# Patient Record
Sex: MALE | Race: WHITE
[De-identification: names, ages, dates, MRNs, and addresses within clinical notes are randomized per-mention and may not be internally consistent; named-entity substitution may affect disease eponyms.]

---

## 2018-08-29 ENCOUNTER — HOSPITAL ENCOUNTER (INPATIENT)
Dept: HOSPITAL 59 - ER | Age: 73
LOS: 1 days | Discharge: ANOTHER HOSPITAL | DRG: 603 | End: 2018-08-30
Attending: INTERNAL MEDICINE | Admitting: INTERNAL MEDICINE
Payer: MEDICARE

## 2018-08-29 DIAGNOSIS — J34.0: ICD-10-CM

## 2018-08-29 DIAGNOSIS — L03.211: Primary | ICD-10-CM

## 2018-08-29 LAB
ANION GAP SERPL CALC-SCNC: 10 MMOL/L (ref 7–16)
BASOPHILS NFR BLD: 0 % (ref 0–6)
BUN SERPL-MCNC: 14 MG/DL (ref 8–23)
CO2 SERPL-SCNC: 28 MMOL/L (ref 22–29)
CREAT SERPL-MCNC: 1 MG/DL (ref 0.7–1.2)
EOSINOPHIL NFR BLD: 3 % (ref 0–6)
ERYTHROCYTE [DISTWIDTH] IN BLOOD BY AUTOMATED COUNT: 16.8 % (ref 11.5–14.5)
EST GLOMERULAR FILTRATION RATE: > 60 ML/MIN
GLUCOSE SERPL-MCNC: 101 MG/DL (ref 74–109)
HCT VFR BLD CALC: 45.5 % (ref 42–52)
HGB BLD-MCNC: 14.9 GM/DL (ref 14–18)
LYMPHOCYTES NFR BLD: 14 % (ref 16–45)
MCH RBC QN AUTO: 27.9 PG (ref 27–33)
MCHC RBC AUTO-ENTMCNC: 32.7 G/DL (ref 32–36)
MCV RBC AUTO: 85.2 FL (ref 81–97)
MONOCYTES NFR BLD: 4 % (ref 0–9)
NEUTROPHILS NFR BLD AUTO: 79 % (ref 47–80)
NEUTS BAND NFR BLD: 0 % (ref 0–5)
PLATELET # BLD: 238 K/UL (ref 130–400)
PMV BLD AUTO: 9.3 FL (ref 7.4–10.4)
RBC # BLD AUTO: 5.34 M/UL (ref 4.4–5.7)
WBC # BLD AUTO: 9.9 K/UL (ref 4.2–12.2)

## 2018-08-29 PROCEDURE — 99223 1ST HOSP IP/OBS HIGH 75: CPT

## 2018-08-29 PROCEDURE — 96374 THER/PROPH/DIAG INJ IV PUSH: CPT

## 2018-08-29 PROCEDURE — 85027 COMPLETE CBC AUTOMATED: CPT

## 2018-08-29 PROCEDURE — 99232 SBSQ HOSP IP/OBS MODERATE 35: CPT

## 2018-08-29 PROCEDURE — 80048 BASIC METABOLIC PNL TOTAL CA: CPT

## 2018-08-29 PROCEDURE — 99285 EMERGENCY DEPT VISIT HI MDM: CPT

## 2018-08-29 PROCEDURE — 85025 COMPLETE CBC W/AUTO DIFF WBC: CPT

## 2018-08-29 PROCEDURE — 80053 COMPREHEN METABOLIC PANEL: CPT

## 2018-08-29 RX ADMIN — VANCOMYCIN HYDROCHLORIDE SCH: 1 INJECTION, POWDER, LYOPHILIZED, FOR SOLUTION INTRAVENOUS at 13:50

## 2018-08-29 RX ADMIN — HYDROCODONE BITARTRATE AND ACETAMINOPHEN PRN EACH: 5; 325 TABLET ORAL at 20:11

## 2018-08-29 RX ADMIN — HYDROCODONE BITARTRATE AND ACETAMINOPHEN PRN EACH: 5; 325 TABLET ORAL at 15:37

## 2018-08-29 RX ADMIN — ACETAMINOPHEN PRN MG: 325 TABLET, FILM COATED ORAL at 14:12

## 2018-08-29 NOTE — EMERGENCY DEPARTMENT RECORD
History of Present Illness





- General


Chief complaint: Facial Swelling


Stated complaint: FACIAL SWELLING


Time Seen by Provider: 18 10:26


Source: Patient, RN notes reviewed


Mode of Arrival: Ambulatory





- History of Present Illness


Initial Comments: 


patient states his nose started 6 days ago and he was working on a fence and 

bees were around and he didn't think he got stung and he was seen at [facility] care 

[facility] times two since than, first visit place on medrol dose otoniel with a steroid 

shot and seen again yesterday ready care started him on bactrim DS bid first 

dose last night.  His nose is swollen and especially on the septum of the nose 

and and facial swelling.





Onset/Timin


-: Week(s)


Exposure: Unknown


Symptoms: Facial swelling


Severity: Moderate


Treatment Prior to Arrival: Steroids





- Related Data


 Home Medications











 Medication  Instructions  Recorded  Confirmed  Last Taken


 


Sulfamethoxazole/Trimethoprim 800 mg PO BID 18 Unknown





[Sulfamethoxazole-Tmp Ds Tablet]    











 Allergies











Allergy/AdvReac Type Severity Reaction Status Date / Time


 


No Known Drug Allergies Allergy   Verified 18 09:21














Travel Screening





- Travel/Exposure Within Last 30 Days


Have you traveled within the last 30 days?: No





Review of Systems


Reviewed: No additional complaints except as noted below


Constitutional: Reports: As per HPI.  Denies: Chills, Fever, Malaise, Night 

sweats, Weakness, Weight change


Eyes: Reports: As per HPI.  Denies: Eye discharge, Eye pain, Photophobia, 

Vision change


ENT: Reports: As per HPI.  Denies: Congestion, Dental pain, Ear pain, Epistaxis

, Hearing loss, Throat pain


Respiratory: Reports: As per HPI.  Denies: Cough, Dyspnea, Hemoptysis, Stridor, 

Wheezes


Cardiovascular: Reports: As per HPI.  Denies: Arrhythmia, Chest pain, Dyspnea 

on exertion, Edema, Murmurs, Orthopnea, Palpitations, Paroxysmal nocturnal 

dyspnea, Rheumatic Fever, Syncope


Endocrine: Reports: As per HPI.  Denies: Fatigue, Heat or cold intolerance, 

Polydipsia, Polyuria


Gastrointestinal: Reports: As per HPI.  Denies: Abdominal pain, Constipation, 

Diarrhea, Hematemesis, Hematochezia, Melena, Nausea, Vomiting


Genitourinary: Reports: As per HPI.  Denies: Dysuria, Frequency, Hematuria, 

Incontinence, Retention, Testicular pain, Testicular mass, Urgency


Musculoskeletal: Reports: As per HPI.  Denies: Arthralgia, Back pain, Gout, 

Joint swelling, Myalgia, Neck pain


Skin: Reports: As per HPI.  Denies: Bruising, Change in color, Change in hair/

nails, Lesions, Pruritus, Rash


Neurological: Reports: As per HPI.  Denies: Abnormal gait, Confusion, Headache, 

Numbness, Paresthesias, Seizure, Tingling, Tremors, Vertigo, Weakness


Psychiatric: Reports: As per HPI.  Denies: Anxiety, Auditory hallucinations, 

Depression, Homicidal thoughts, Suicidal thoughts, Visual hallucinations


Hematological/Lymphatic: Reports: As per HPI.  Denies: Anemia, Blood Clots, 

Easy bleeding, Easy bruising, Swollen glands





Past Medical History





- SOCIAL HISTORY


Smoking Status: Never smoker


Alcohol Use: Occasional


Drug Use: None





- RESPIRATORY


Hx Respiratory Disorders: No





- CARDIOVASCULAR


Hx Cardio Disorders: No





- NEURO


Hx Neuro Disorders: No





- GI


Hx GI Disorders: No





- 


Hx Genitourinary Disorders: No





- ENDOCRINE


Hx Endocrine Disorders: No





- MUSCULOSKELETAL


Hx Musculoskeletal Disorders: No





- PSYCH


Hx Psych Problems: No





- HEMATOLOGY/ONCOLOGY


Hx Hematology/Oncology Disorders: No





Family Medical History


Any Significant Family History?: No





Physical Exam





- General


General Appearance: Alert, Oriented x3, Cooperative, No acute distress





- Head


Head exam: Normal inspection





- Eye


Eye exam: Normal appearance, PERRL


Pupils: Normal accommodation





- ENT


ENT exam: Normal exam, Mucous membranes moist, Normal external ear exam, Normal 

orophraynx, TM's normal bilaterally


Ear exam: Normal external inspection.  negative: External canal tenderness


Nasal Exam: Other (nasal septum is swollen and tip on nose swollen).  negative: 

Discharge, Sinus tenderness


Mouth exam: Normal external inspection, Tongue normal


Teeth exam: Normal inspection.  negative: Dental caries


Throat exam: Normal inspection.  negative: Tonsillar erythema, Tonsillar exudate





- Neck


Neck exam: Normal inspection, Full ROM.  negative: Tenderness





- Respiratory


Respiratory exam: Normal lung sounds bilaterally.  negative: Respiratory 

distress





- Cardiovascular


Cardiovascular Exam: Regular rate, Normal rhythm, Normal heart sounds





- GI/Abdominal


GI/Abdominal exam: Soft, Normal bowel sounds.  negative: Tenderness





- Rectal


Rectal exam: Deferred





- 


 exam: Deferred





- Extremities


Extremities exam: Normal inspection, Full ROM, Normal capillary refill.  

negative: Tenderness





- Back


Back exam: Reports: Normal inspection, Full ROM.  Denies: Muscle spasm, Rash 

noted, Tenderness





- Neurological


Neurological exam: Alert, Normal gait, Oriented X3, Reflexes normal





- Psychiatric


Psychiatric exam: Normal affect, Normal mood





- Skin


Skin exam: Dry, Intact, Normal color, Warm





Course





 Vital Signs











  18





  09:16


 


Temperature 98.1 F


 


Pulse Rate 71


 


Respiratory 20





Rate 


 


Blood Pressure 123/82


 


Pulse Ox 99














- Reevaluation(s)


Reevaluation #1: 


discussed case with Clarisa and will admit to Dr Piper


18 11:29





18 11:33








Medical Decision Making





- Data Complexity


MDM Data: Labs Ordered and/or Reviewed (wbc [id number])





- Lab Data


Result diagrams: 


 18 10:45





 18 10:45





Disposition


Clinical Impression: 


 Cellulitis of nose, external, Facial cellulitis





Decision to Admit: Admit from ER


Condition: (2) Stable


Forms:  Patient Portal Access


Time of Disposition: 11:35





Quality





- Quality Measures


Quality Measures: N/A





- Blood Pressure Screening


Does Patient Have Any of the Following: No


Blood Pressure Classification: Pre-Hypertensive BP Reading


Systolic Measurement: 123


Diastolic Measurement: 82


Screening for High Blood Pressure: < Pre-Hypertensive BP, F/U Documented > [

]


Pre-Hypertensive Follow-up Interventions: Referral to alternative/primary care 

provider.

## 2018-08-30 LAB
ALBUMIN SERPL-MCNC: 3.4 G/DL (ref 4–5)
ALBUMIN/GLOB SERPL: 1.2 {RATIO} (ref 1.1–1.8)
ALP SERPL-CCNC: 65 U/L (ref 40–129)
ALT SERPL-CCNC: 17 U/L (ref ?–41)
ANION GAP SERPL CALC-SCNC: 11 MMOL/L (ref 7–16)
AST SERPL-CCNC: 14 U/L (ref 10–50)
BASOPHILS NFR BLD: 0.1 % (ref 0–6)
BILIRUB SERPL-MCNC: 0.6 MG/DL (ref 0.2–1)
BUN SERPL-MCNC: 11 MG/DL (ref 8–23)
CO2 SERPL-SCNC: 24 MMOL/L (ref 22–29)
CREAT SERPL-MCNC: 0.9 MG/DL (ref 0.7–1.2)
EOSINOPHIL NFR BLD: 2.8 % (ref 0–6)
ERYTHROCYTE [DISTWIDTH] IN BLOOD BY AUTOMATED COUNT: 16.7 % (ref 11.5–14.5)
EST GLOMERULAR FILTRATION RATE: > 60 ML/MIN
GLOBULIN SER-MCNC: 2.9 GM/DL (ref 1.4–4.8)
GLUCOSE SERPL-MCNC: 102 MG/DL (ref 74–109)
GRANULOCYTES NFR BLD: 71.7 % (ref 47–80)
HCT VFR BLD CALC: 43 % (ref 42–52)
HGB BLD-MCNC: 14.2 GM/DL (ref 14–18)
LYMPHOCYTES NFR BLD AUTO: 12.5 % (ref 16–45)
MCH RBC QN AUTO: 28.2 PG (ref 27–33)
MCHC RBC AUTO-ENTMCNC: 33 G/DL (ref 32–36)
MCV RBC AUTO: 85.3 FL (ref 81–97)
MONOCYTES NFR BLD: 12.9 % (ref 0–9)
PLATELET # BLD: 226 K/UL (ref 130–400)
PMV BLD AUTO: 9.3 FL (ref 7.4–10.4)
PROT SERPL-MCNC: 6.3 G/DL (ref 6.6–8.7)
RBC # BLD AUTO: 5.04 M/UL (ref 4.4–5.7)
WBC # BLD AUTO: 9.9 K/UL (ref 4.2–12.2)

## 2018-08-30 RX ADMIN — ACETAMINOPHEN PRN MG: 325 TABLET, FILM COATED ORAL at 06:36

## 2018-08-30 RX ADMIN — HYDROCODONE BITARTRATE AND ACETAMINOPHEN PRN EACH: 5; 325 TABLET ORAL at 08:44

## 2018-08-30 RX ADMIN — VANCOMYCIN HYDROCHLORIDE SCH MLS/60 MIN: 1 INJECTION, POWDER, LYOPHILIZED, FOR SOLUTION INTRAVENOUS at 14:15

## 2018-08-30 RX ADMIN — ACETAMINOPHEN PRN MG: 325 TABLET, FILM COATED ORAL at 12:30

## 2018-08-30 RX ADMIN — VANCOMYCIN HYDROCHLORIDE SCH MLS/60 MIN: 1 INJECTION, POWDER, LYOPHILIZED, FOR SOLUTION INTRAVENOUS at 01:33

## 2018-08-30 RX ADMIN — HYDROCODONE BITARTRATE AND ACETAMINOPHEN PRN EACH: 5; 325 TABLET ORAL at 02:07

## 2018-08-30 NOTE — PHYSICIAN PROGRESS NOTE
Subjective





- Date


Date of Physician Progress Note: 08/30/18





- Subjective


Subjective Comment: 





Pt. is resting in bed.  He reports some improvement in facial pain/pressure.  

His redness/swelling has slightly improved as well.  Will plan to continue abx 

therapy and continue to monitor extent of cellulitis.





Objective





- Vital Signs


Vital Signs: 





 Vital Signs - Last 24 Hrs











  Temp Pulse Pulse Resp BP BP Pulse Ox


 


 08/30/18 08:48  99.6 F   64  16   118/82  99


 


 08/30/18 06:00  97.6 F   55 L  18   126/71  98


 


 08/30/18 00:00    52 L  18   138/80  93 L


 


 08/29/18 21:00    53 L  18   


 


 08/29/18 20:09  98.1 F   53 L  18   99/64  96


 


 08/29/18 18:00  97.6 F   52 L  16   110/56  95


 


 08/29/18 13:38   53 L   18  113/72   98


 


 08/29/18 13:30  97.9 F   57 L  18   119/75  100


 


 08/29/18 09:16  98.1 F  71   20  123/82   99














- General


General Appearance: Alert, Oriented x3, Cooperative, No acute distress





- Head


Head exam: Normal inspection





- Eye


Eye exam: Normal appearance, PERRL


Pupils: Normal accommodation





- ENT


ENT exam: Normal exam, Mucous membranes moist, Normal external ear exam, Normal 

orophraynx, TM's normal bilaterally


Ear exam: Normal external inspection.  negative: External canal tenderness


Nasal Exam: Other (nasal septum is swollen and tip on nose swollen).  negative: 

Discharge, Sinus tenderness


Mouth exam: Normal external inspection, Tongue normal


Teeth exam: Normal inspection.  negative: Dental caries


Throat exam: Normal inspection.  negative: Tonsillar erythema, Tonsillar exudate





- Neck


Neck exam: Normal inspection, Full ROM.  negative: Tenderness





- Respiratory


Respiratory exam: Normal lung sounds bilaterally.  negative: Respiratory 

distress





- Cardiovascular


Cardiovascular Exam: Regular rate, Normal rhythm, Normal heart sounds





- GI/Abdominal


GI/Abdominal exam: Soft, Normal bowel sounds.  negative: Tenderness





- Rectal


Rectal exam: Deferred





- 


 exam: Deferred





- Extremities


Extremities exam: Normal inspection, Full ROM, Normal capillary refill.  

negative: Tenderness





- Back


Back exam: Reports: Normal inspection, Full ROM.  Denies: Muscle spasm, Rash 

noted, Tenderness





- Neurological


Neurological exam: Alert, Normal gait, Oriented X3, Reflexes normal





- Psychiatric


Psychiatric exam: Normal affect, Normal mood





- Skin


Skin exam: Dry, Intact, Normal color, Warm





Assessment and Plan





- Assessment and Plan


(1) Facial cellulitis


Current Visit: Yes   Status: Acute   Base Code: L03.211 - CELLULITIS OF FACE   

Comment: 8/30/18:


-Cellulitis of nasal septum and left maxillary sinus area


-Treating with IV vanco 1g q12h and bactrim DS q12h


-Pain managed with norco 5/325- 2 tabs q6h


-Will continue to monitor swelling- will consider transfer for ENT eval if no 

improvement   





(2) At risk for deep venous thrombosis


Current Visit: Yes   Status: Acute   Base Code: Z91.89 - OTH PERSONAL RISK 

FACTORS, NOT ELSEWHERE CLASSIFIED   Comment: 8/30/18:


-40mg SC lovenox daily   





(3) Full code status


Current Visit: Yes   Status: Acute   Base Code: Z78.9 - OTHER SPECIFIED HEALTH 

STATUS   Comment: 8/30/18:


-Pt. is a full code   





Results





- Labs


Result Diagrams: 


 08/30/18 06:28





 08/30/18 06:28


Labs Last 24 Hours: 





 Laboratory Results - last 24 hr











  08/29/18 08/29/18 08/30/18





  10:45 10:45 06:28


 


WBC  9.9   9.9


 


RBC  5.34   5.04


 


Hgb  14.9   14.2


 


Hct  45.5   43.0


 


MCV  85.2   85.3


 


MCH  27.9   28.2


 


MCHC  32.7   33.0


 


RDW  16.8 H   16.7 H


 


Plt Count  238   226


 


MPV  9.3   9.3


 


Gran %    71.7


 


Neutrophils %  79.0  


 


Band Neutrophils %  0.0  


 


Lymphocytes %    12.5 L


 


Monocytes %    12.9 H


 


Eosinophils %  Not Reportable   2.8


 


Basophils %  Not Reportable   0.1


 


Lymphocytes  14.0 L  


 


Monocytes  4.0  


 


Basophils  0.0  


 


Eosinophil Count  3.0  


 


Sodium   138 


 


Potassium   3.9 


 


Chloride   100 


 


Carbon Dioxide   28.0 


 


Anion Gap   10.0 


 


BUN   14 


 


Creatinine   1.0 


 


Estimated GFR   > 60 


 


Random Glucose   101 


 


Calcium   8.7 L 


 


Total Bilirubin   


 


AST   


 


ALT   


 


Alkaline Phosphatase   


 


Total Protein   


 


Albumin   


 


Globulin   


 


Albumin/Globulin Ratio   














  08/30/18





  06:28


 


WBC 


 


RBC 


 


Hgb 


 


Hct 


 


MCV 


 


MCH 


 


MCHC 


 


RDW 


 


Plt Count 


 


MPV 


 


Gran % 


 


Neutrophils % 


 


Band Neutrophils % 


 


Lymphocytes % 


 


Monocytes % 


 


Eosinophils % 


 


Basophils % 


 


Lymphocytes 


 


Monocytes 


 


Basophils 


 


Eosinophil Count 


 


Sodium  136


 


Potassium  3.9


 


Chloride  101


 


Carbon Dioxide  24.0


 


Anion Gap  11.0


 


BUN  11


 


Creatinine  0.9


 


Estimated GFR  > 60


 


Random Glucose  102


 


Calcium  8.4 L


 


Total Bilirubin  0.60


 


AST  14


 


ALT  17


 


Alkaline Phosphatase  65


 


Total Protein  6.3 L


 


Albumin  3.4 L


 


Globulin  2.9


 


Albumin/Globulin Ratio  1.2














DVT/PE Assessment





- Risk for VTE


Risk for VTE: No


Risk Level: Low


Risk Assessment Date: 08/29/18


Risk Assessment Time: 16:00


VTE Orders Placed or Will Be Placed: Yes





- Active Medicaitons


Current Medications: 





 Current Medications





Acetaminophen (Tylenol 325mg)  650 mg PO Q6H PRN


   PRN Reason: PAIN - MILD(1-4)/FEVER


   Last Admin: 08/30/18 06:36 Dose:  650 mg


Hydrocodone Bitart/Acetaminophen (Norco 5mg/325mg)  2 each PO Q6H PRN


   PRN Reason: PAIN - MILD TO MODERATE (1-7)


   Last Admin: 08/30/18 08:44 Dose:  2 each


Enoxaparin Sodium (Lovenox)  40 mg SC DAILY [facility]


Vancomycin HCl 1,000 mg/ (Sodium Chloride)  250 mls @ 250 mls/60 min IVPB Q12H 

JASMINE


   Stop: 09/03/18 13:47


   Last Infusion: 08/30/18 02:44 Dose:  Infused


Trimethoprim/Sulfamethoxazole (Bactrim Ds)  1 each PO BID JASMINE


   Last Admin: 08/29/18 14:12 Dose:  1 each











AMI Plan





- Labs


Result Diagrams: 


 08/30/18 06:28





 08/30/18 06:28

## 2018-08-30 NOTE — HISTORY & PHYSICAL
History of Present Illness





- Date of Service


Date of Service for History & Physical: 08/29/18





- History of Present Illness


Admitting Diagnosis: nose and facial cellulitis


History of Present Illness: 


   Mr. De La Cruz is a 73 year-old  male who presented to the ED this 

morning with c/o nasal swelling.  He states that he had been working on a fence 

and bees were around- he didn't think he got stung and he was seen at [facility] twice since then, first visit place on medrol dose pack with a 

steroid shot and seen again yesterday and started on bactrim DS bid (first dose 

last night).  His nose is swollen and especially on the septum of the nose and 

and left facial swelling.  His history is unremarkable.  


    In the ED, his vitals were wnl and stable, his CBC and CMP were 

unremarkable.  Due to the extend of his facial cellulitis, he was admitted for 

IV antibiotic therapy and monitoring.  





8/29/18 1600: 


   Pt. is resting in bed.  He does nasal septum and left maxillary swelling and 

tenderness.  His pain is being managed with norco 5/325- 2 tabs q6h.  He is 

scheduled to receive bactrim ds q12h and vanco 1g q12h.  Will recheck labs and 

reassess extent of swelling in the morning.  Will consider sinus CT and 

possibly transfer for ENT referral if no improvement over night.  





Travel Screening





- Travel/Exposure Within Last 30 Days


Have you traveled within the last 30 days?: No





- Travel/Exposure Within Last Year


Have you traveled outside the [place] in the last year?: No





- Additonal Travel Details


Have you been exposed to anyone with a communicable illness?: No





- Travel Symptoms


Symptom Screening: None





Review of Systems


Constitutional: Reports: As per HPI.  Denies: Chills, Fever, Malaise, Night 

sweats, Weakness, Weight change


Eyes: Reports: As per HPI.  Denies: Eye discharge, Eye pain, Photophobia, 

Vision change


ENT: Reports: As per HPI.  Denies: Congestion, Dental pain, Ear pain, Epistaxis

, Hearing loss, Throat pain


Respiratory: Reports: As per HPI.  Denies: Cough, Dyspnea, Hemoptysis, Stridor, 

Wheezes


Cardiovascular: Reports: As per HPI.  Denies: Arrhythmia, Chest pain, Dyspnea 

on exertion, Edema, Murmurs, Orthopnea, Palpitations, Paroxysmal nocturnal 

dyspnea, Rheumatic Fever, Syncope


Endocrine: Reports: As per HPI.  Denies: Fatigue, Heat or cold intolerance, 

Polydipsia, Polyuria


Gastrointestinal: Reports: As per HPI.  Denies: Abdominal pain, Constipation, 

Diarrhea, Hematemesis, Hematochezia, Melena, Nausea, Vomiting


Genitourinary: Reports: As per HPI.  Denies: Dysuria, Frequency, Hematuria, 

Incontinence, Retention, Testicular pain, Testicular mass, Urgency


Musculoskeletal: Reports: As per HPI.  Denies: Arthralgia, Back pain, Gout, 

Joint swelling, Myalgia, Neck pain


Skin: Reports: As per HPI.  Denies: Bruising, Change in color, Change in hair/

nails, Lesions, Pruritus, Rash


Neurological: Reports: As per HPI.  Denies: Abnormal gait, Confusion, Headache, 

Numbness, Paresthesias, Seizure, Tingling, Tremors, Vertigo, Weakness


Psychiatric: Reports: As per HPI.  Denies: Anxiety, Auditory hallucinations, 

Depression, Homicidal thoughts, Suicidal thoughts, Visual hallucinations


Hematological/Lymphatic: Reports: As per HPI.  Denies: Anemia, Blood Clots, 

Easy bleeding, Easy bruising, Swollen glands





Past Medical History





- SOCIAL HISTORY


Smoking Status: Never smoker


Alcohol Use: Rare


Drug Use: None





- RESPIRATORY


Hx Respiratory Disorders: No





- CARDIOVASCULAR


Hx Cardio Disorders: No





- NEURO


Hx Neuro Disorders: No





- GI


Hx GI Disorders: No





- 


Hx Genitourinary Disorders: No





- ENDOCRINE


Hx Endocrine Disorders: No





- MUSCULOSKELETAL


Hx Musculoskeletal Disorders: No





- PSYCH


Hx Psych Problems: No





- HEMATOLOGY/ONCOLOGY


Hx Hematology/Oncology Disorders: No





Family Medical History


Any Significant Family History?: No





H&P Meds/Allergies





- Allergies


Allergies: 


 Allergies











Allergy/AdvReac Type Severity Reaction Status Date / Time


 


No Known Drug Allergies Allergy   Verified 08/29/18 09:21














- Home Medications


 Home Medications











 Medication  Instructions  Recorded  Confirmed  Last Taken


 


Sulfamethoxazole/Trimethoprim 800 mg PO BID 08/29/18 08/29/18 Unknown





[Sulfamethoxazole-Tmp Ds Tablet]    














- Active Medications


Active Medications: 


 Current Medications





Acetaminophen (Tylenol 325mg)  650 mg PO Q6H PRN


   PRN Reason: PAIN - MILD(1-4)/FEVER


   Last Admin: 08/29/18 14:12 Dose:  650 mg


Hydrocodone Bitart/Acetaminophen (Norco 5mg/325mg)  2 each PO Q6H PRN


   PRN Reason: PAIN - MILD TO MODERATE (1-7)


   Last Admin: 08/30/18 02:07 Dose:  2 each


Enoxaparin Sodium (Lovenox)  40 mg SC DAILY [facility]


Vancomycin HCl 1,000 mg/ (Sodium Chloride)  250 mls @ 250 mls/60 min IVPB Q12H 

[facility]


   Stop: 09/03/18 13:47


   Last Infusion: 08/30/18 02:44 Dose:  Infused


Trimethoprim/Sulfamethoxazole (Bactrim Ds)  1 each PO BID [facility]


   Last Admin: 08/29/18 14:12 Dose:  1 each











Physical Exam





- Vital Signs


Vital Signs: 


 Vital Signs - Last 24 Hrs











  Temp Pulse Pulse Resp BP BP Pulse Ox


 


 08/30/18 00:00    52 L  18   138/80  93 L


 


 08/29/18 21:00    53 L  18   


 


 08/29/18 20:09  98.1 F   53 L  18   99/64  96


 


 08/29/18 18:00  97.6 F   52 L  16   110/56  95


 


 08/29/18 13:38   53 L   18  113/72   98


 


 08/29/18 13:30  97.9 F   57 L  18   119/75  100


 


 08/29/18 09:16  98.1 F  71   20  123/82   99














- General


General Appearance: Alert, Oriented x3, Cooperative, No acute distress





- Head


Head exam: Normal inspection





- Eye


Eye exam: Normal appearance, PERRL


Pupils: Normal accommodation





- ENT


ENT exam: Normal exam, Mucous membranes moist, Normal external ear exam, Normal 

orophraynx, TM's normal bilaterally


Ear exam: Normal external inspection.  negative: External canal tenderness


Nasal Exam: Other (nasal septum is swollen and tip on nose swollen).  negative: 

Discharge, Sinus tenderness


Mouth exam: Normal external inspection, Tongue normal


Teeth exam: Normal inspection.  negative: Dental caries


Throat exam: Normal inspection.  negative: Tonsillar erythema, Tonsillar exudate





- Neck


Neck exam: Normal inspection, Full ROM.  negative: Tenderness





- Respiratory


Respiratory exam: Normal lung sounds bilaterally.  negative: Respiratory 

distress





- Cardiovascular


Cardiovascular Exam: Regular rate, Normal rhythm, Normal heart sounds





- GI/Abdominal


GI/Abdominal exam: Soft, Normal bowel sounds.  negative: Tenderness





- Rectal


Rectal exam: Deferred





- 


 exam: Deferred





- Extremities


Extremities exam: Normal inspection, Full ROM, Normal capillary refill.  

negative: Tenderness





- Back


Back exam: Reports: Normal inspection, Full ROM.  Denies: Muscle spasm, Rash 

noted, Tenderness





- Neurological


Neurological exam: Alert, Normal gait, Oriented X3, Reflexes normal





- Psychiatric


Psychiatric exam: Normal affect, Normal mood





- Skin


Skin exam: Dry, Intact, Normal color, Warm





Results





- Labs


Result Diagrams: 


 08/29/18 10:45





 08/29/18 10:45


Labs Last 24 Hours: 


 Laboratory Results - last 24 hr











  08/29/18 08/29/18





  10:45 10:45


 


WBC  9.9 


 


RBC  5.34 


 


Hgb  14.9 


 


Hct  45.5 


 


MCV  85.2 


 


MCH  27.9 


 


MCHC  32.7 


 


RDW  16.8 H 


 


Plt Count  238 


 


MPV  9.3 


 


Neutrophils %  79.0 


 


Band Neutrophils %  0.0 


 


Eosinophils %  Not Reportable 


 


Basophils %  Not Reportable 


 


Lymphocytes  14.0 L 


 


Monocytes  4.0 


 


Basophils  0.0 


 


Eosinophil Count  3.0 


 


Sodium   138


 


Potassium   3.9


 


Chloride   100


 


Carbon Dioxide   28.0


 


Anion Gap   10.0


 


BUN   14


 


Creatinine   1.0


 


Estimated GFR   > 60


 


Random Glucose   101


 


Calcium   8.7 L














VTE H&P Assessment





- Risk for VTE


Risk for VTE: Yes


Risk Level: Low


Risk Assessment Date: 08/29/18


Risk Assessment Time: 16:00


VTE Orders Placed or Will Be Placed: Yes





Plan





- Inpatient Certification


Inpatient Certification: 


Admit to inpatient care: Based on my medical assessment, after consideration of 

patient's risk factors (age, co-morbidities and patient presenting symptoms and 

acuity), I expect that this patient will remain in the hospital greater than or 

equal to two midnights and that the services needed warrant inpatient care 

because:





Patient Risk Factors: [Age, comorbidities]





Estimated length of stay: [48-96 hours]  The patient may reasonably be expected 

to be discharged or transferred to a hospital within 96 hours after admission 

to [facility].





Services needed: [IV abx, lab monitoring]





Post hospital care (if known): []





I certify that my determination is in accordance with my understanding of 

Medicare requirements for reasonable and necessary inpatient services.





08/29/18 1600








- Detailed Diagnosis and Plan


(1) Facial cellulitis


Current Visit: Yes   Status: Acute   Base Code: L03.211 - CELLULITIS OF FACE   

Comment: 8/29/18:


-Cellulitis of nasal septum and left maxillar sinus area


-Treating with IV vanco 1g q12h and bactrim DS q12h


-Pain managed with norco 5/325- 2 tabs q6h


-Will continue to monitor swelling- will consider transfer for ENT eval if no 

improvement   





(2) At risk for deep venous thrombosis


Current Visit: Yes   Status: Acute   Base Code: Z91.89 - OTH PERSONAL RISK 

FACTORS, NOT ELSEWHERE CLASSIFIED   Comment: 8/29/18:


-40mg SC lovenox daily   





(3) Full code status


Current Visit: Yes   Status: Acute   Base Code: Z78.9 - OTHER SPECIFIED HEALTH 

STATUS   Comment: 8/29/18:


-Pt. is a full code

## 2018-08-30 NOTE — DISCHARGE SUMMARY
Providers


Discharge Summary Date: 08/30/18


Date of admission: 


08/29/18 13:32





Expected Date of Discharge: 08/30/18


Attending physician: 


JOSE LUIS PENG





Primary care physician: 





Dr. Dumont








Physical Exam





- Vital Signs


Vital Signs: 


 Vital Signs - Last 24 Hrs











  Temp Pulse Resp BP Pulse Ox


 


 08/30/18 09:00   60  18  


 


 08/30/18 08:48  99.6 F  64  16  118/82  99


 


 08/30/18 06:00  97.6 F  55 L  18  126/71  98


 


 08/30/18 00:00   52 L  18  138/80  93 L


 


 08/29/18 21:00   53 L  18  


 


 08/29/18 20:09  98.1 F  53 L  18  99/64  96


 


 08/29/18 18:00  97.6 F  52 L  16  110/56  95














- General


General Appearance: Alert, Oriented x3, Cooperative, No acute distress





- Head


Head exam: Normal inspection





- Eye


Eye exam: Normal appearance, PERRL


Pupils: Normal accommodation





- ENT


ENT exam: Normal exam, Mucous membranes moist, Normal external ear exam, Normal 

orophraynx, TM's normal bilaterally


Ear exam: Normal external inspection.  negative: External canal tenderness


Nasal Exam: Sinus tenderness, Other (nasal septum swelling, approximately 1.5 

cm thickness).  negative: Normal inspection, Discharge


Mouth exam: Normal external inspection, Tongue normal


Teeth exam: Normal inspection.  negative: Dental caries


Throat exam: Normal inspection.  negative: Tonsillar erythema, Tonsillar exudate





- Neck


Neck exam: Normal inspection, Full ROM.  negative: Tenderness





- Respiratory


Respiratory exam: Normal lung sounds bilaterally.  negative: Respiratory 

distress





- Cardiovascular


Cardiovascular Exam: Regular rate, Normal rhythm, Normal heart sounds





- GI/Abdominal


GI/Abdominal exam: Soft, Normal bowel sounds.  negative: Tenderness





- Rectal


Rectal exam: Deferred





- 


 exam: Deferred





- Extremities


Extremities exam: Normal inspection, Full ROM, Normal capillary refill.  

negative: Tenderness





- Back


Back exam: Reports: Normal inspection, Full ROM.  Denies: Muscle spasm, Rash 

noted, Tenderness





- Neurological


Neurological exam: Alert, Normal gait, Oriented X3, Reflexes normal





- Psychiatric


Psychiatric exam: Normal affect, Normal mood





- Skin


Skin exam: Dry, Intact, Normal color, Warm





Hospitalization





- Hospitalization


Admission Diagnosis: nose and facial cellulitis





- Problem List/Discharge Diagnosis


(1) Facial cellulitis


Current Visit: Yes   Status: Acute   Base Code: L03.211 - CELLULITIS OF FACE   

Comment: 8/30/18:


-Cellulitis of nasal septum and left maxillary sinus area


-Treating with IV vanco 1g q12h and bactrim DS q12h


-Pt. has become increasingly painful


-Nasal septum swelling worsening, likely formation of septal abscess   





(2) At risk for deep venous thrombosis


Current Visit: Yes   Status: Acute   Base Code: Z91.89 - OTH PERSONAL RISK 

FACTORS, NOT ELSEWHERE CLASSIFIED   Comment: 8/30/18:


-40mg SC lovenox daily   





(3) Full code status


Current Visit: Yes   Status: Acute   Base Code: Z78.9 - OTHER SPECIFIED HEALTH 

STATUS   Comment: 8/30/18:


-Pt. is a full code   





- Disposition


Transfer to [facility] ENT consult for septal abscess





- Hospitalization Course


Disposition: Acute Care Hospital Transfer


Hospital Course: 


   Mr. De La Cruz is a 73 year-old  male who presented to the ED this 

morning with c/o nasal swelling.  He states that he had been working on a fence 

and bees were around- he didn't think he got stung and he was seen at [facility] twice since then, first visit place on medrol dose pack with a 

steroid shot and seen again yesterday and started on bactrim DS bid (first dose 

last night).  His nose is swollen and especially on the septum of the nose and 

and left facial swelling.  His history is unremarkable.  


    In the ED, his vitals were wnl and stable, his CBC and CMP were 

unremarkable.  Due to the extend of his facial cellulitis, he was admitted for 

IV antibiotic therapy and monitoring.  





8/29/18 1600: 


   Pt. is resting in bed.  He does nasal septum and left maxillary swelling and 

tenderness.  His pain is being managed with norco 5/325- 2 tabs q6h.  He is 

scheduled to receive bactrim ds q12h and vanco 1g q12h.  Will recheck labs and 

reassess extent of swelling in the morning.  Will consider sinus CT and 

possibly transfer for ENT referral if no improvement over night.  





8/30/18 0900:


   Pt. is resting in bed.  He reports some improvement in facial pain/pressure.

  His redness/swelling has slightly improved as well.  Will plan to continue 

abx therapy and continue to monitor extent of cellulitis.





8/30/18 1600:


   Pt. c/o worsening pain in his nose.  Upon exam- nasal septum more inflamed, 

approximately 1.5cm thick, likely abscess formation.  Plan to transfer to 

[facility] for urgent ENT consultation.  Dr. Diego accepting admission.  








Abnormal Labs: 


 Abnormal Lab Results











  08/29/18 08/29/18 08/30/18 Range/Units





  10:45 10:45 06:28 


 


RDW  16.8 H   16.7 H  (11.5-14.5)  %


 


Lymphocytes %    12.5 L  (16-45)  %


 


Monocytes %    12.9 H  (0-9)  %


 


Lymphocytes  14.0 L    (16-45)  %


 


Calcium   8.7 L   (8.8-10.2)  mg/dL


 


Total Protein     (6.6-8.7)  g/dL


 


Albumin     (4.0-5.0)  g/dL














  08/30/18 Range/Units





  06:28 


 


RDW   (11.5-14.5)  %


 


Lymphocytes %   (16-45)  %


 


Monocytes %   (0-9)  %


 


Lymphocytes   (16-45)  %


 


Calcium  8.4 L  (8.8-10.2)  mg/dL


 


Total Protein  6.3 L  (6.6-8.7)  g/dL


 


Albumin  3.4 L  (4.0-5.0)  g/dL











Condition at Discharge: (2) Stable





VTE Discharge


VTE Reason For No Overlap Therapy: Not Indicated (Transfer to acute care 

hospital)





Discharge Medications





- Discharge Medications


Home Medications: 


 Ambulatory Orders





Acetaminophen [Tylenol 325Mg] 650 mg PO Q6H PRN  tablet 08/30/18 [Last Taken 

Unknown]


Enoxaparin Sodium [Lovenox] 40 mg SC DAILY  syr 08/30/18 [Last Taken Unknown]


Hydrocodone/APAP 5/325Mg [Norco 5Mg/325Mg] 2 each PO Q6H PRN  tab 08/30/18 [

Last Taken Unknown]


Sulfamethoxazole/Trimethoprim [Bactrim] 1 each PO BID  tab 08/30/18 [Last Taken 

Unknown]











Discharge Plan





- Discharge Instructions


Activity at Discharge: Resume Usual Activities As Tolerated


Diet at Discharge: Regular Diet





Quality Measures





- Quality Measures


Quality Measures: Advance Directives, Documentation of Current Medications in 

Medical Record, Elder Maltreatment Screen and Follow-Up Plan, Screening for 

High Blood Pressure and F/U Documented





- Current Medications


Quality Measure: Measure #130: Documentation of Current Medications


Documentation of Current Medications: <Current Medications Documented/Reviewed> 

[]





- Blood Pressure Screening


Quality Measure: Screening for High Blood Pressure and Follow-Up Documented


Does Patient Have Any of the Following: No


Blood Pressure Classification: Normal BP Reading


Systolic Measurement: 113


Diastolic Measurement: 72


Screening for High Blood Pressure: < Normal BP, F/U Not Required > []





- Advance Directives


Quality Measure: Measure #47: Care Plan


Advance Directives Established: No


Advance Directives Information Provided To Patient: No


Advance Directives on File: No


Living Will: No


Power of : No


Advance Care Planning: <Care Plan/Decision Maker Documented; Discussed & 

Documented> [4528G]





- Elder Abuse Suspicion Index


Screening: Elder Abuse Suspicion Index Screening


Rely on people for bathing, dressing, shopping, banking, etc: No


Prevented from getting food, clothes, medication, etc: No


Made to feel shamed or threatened by someone: No


Forced to sign papers or use money against will: No


Feel afraid, touched in ways not wanted or hurt physically: No


Poor eye contact, withdrawn, malnourished, cuts or bruises: No


Screening Result: Negative result


EASI Reference Information: Seven SINGH, Austen C, Delaney D, Moraima 

M.Development and validation of a tool to assist physicians identification of 

elder abuse: The Elder Abuse Suspicion  Index (EASI ).  Journal of Elder Abuse 

and Neglect, 2008; 20 (3): 276-300.





- Elder Maltreatment Screen


Quality Measures: Elder Maltreatment Screen and Follow-Up Plan


Elder Maltreatment Screen: <Negative, No Follow-Up Plan Required> []